# Patient Record
Sex: FEMALE | Race: BLACK OR AFRICAN AMERICAN | HISPANIC OR LATINO
[De-identification: names, ages, dates, MRNs, and addresses within clinical notes are randomized per-mention and may not be internally consistent; named-entity substitution may affect disease eponyms.]

---

## 2017-03-21 ENCOUNTER — IMPORTED ENCOUNTER (OUTPATIENT)
Age: 67
End: 2017-03-21

## 2017-03-21 PROCEDURE — 99214 OFFICE O/P EST MOD 30 MIN: CPT

## 2017-04-14 ENCOUNTER — IMPORTED ENCOUNTER (OUTPATIENT)
Age: 67
End: 2017-04-14

## 2017-04-14 PROCEDURE — 92134 CPTRZ OPH DX IMG PST SGM RTA: CPT

## 2017-04-14 PROCEDURE — 99213 OFFICE O/P EST LOW 20 MIN: CPT

## 2017-04-14 PROCEDURE — 92083 EXTENDED VISUAL FIELD XM: CPT

## 2017-05-12 ENCOUNTER — IMPORTED ENCOUNTER (OUTPATIENT)
Age: 67
End: 2017-05-12

## 2017-05-12 ENCOUNTER — PREPPED CHART (OUTPATIENT)
Age: 67
End: 2017-05-12

## 2017-05-12 PROCEDURE — 76514 ECHO EXAM OF EYE THICKNESS: CPT

## 2017-05-12 PROCEDURE — 92020 GONIOSCOPY: CPT

## 2017-05-12 PROCEDURE — 99213 OFFICE O/P EST LOW 20 MIN: CPT

## 2017-09-15 ENCOUNTER — ESTABLISHED (OUTPATIENT)
Age: 67
End: 2017-09-15

## 2017-09-15 DIAGNOSIS — H16.223: ICD-10-CM

## 2017-09-15 DIAGNOSIS — H40.052: ICD-10-CM

## 2017-09-15 DIAGNOSIS — M32.10: ICD-10-CM

## 2017-09-15 DIAGNOSIS — H40.1112: ICD-10-CM

## 2017-09-15 PROCEDURE — G8785 BP SCRN NO PERF AT INTERVAL: HCPCS

## 2017-09-15 PROCEDURE — 2027F OPTIC NERVE HEAD EVAL DONE: CPT

## 2017-09-15 PROCEDURE — 1036F TOBACCO NON-USER: CPT

## 2017-09-15 PROCEDURE — 3285F IOP DOWN <15% OF PRE-SVC LVL: CPT

## 2017-09-15 PROCEDURE — G8427 DOCREV CUR MEDS BY ELIG CLIN: HCPCS

## 2017-09-15 PROCEDURE — 92250 FUNDUS PHOTOGRAPHY W/I&R: CPT

## 2017-09-15 PROCEDURE — 92012 INTRM OPH EXAM EST PATIENT: CPT

## 2017-09-15 PROCEDURE — 0517F GLAUCOMA PLAN OF CARE DOCD: CPT

## 2017-09-15 PROCEDURE — 4040F PNEUMOC VAC/ADMIN/RCVD: CPT | Mod: 8P

## 2017-09-15 PROCEDURE — 3284F IOP RED >=15% PRE-NTRV LVL: CPT

## 2017-09-15 ASSESSMENT — VISUAL ACUITY
OS_CC: 20/30-1
OS_CC: 20/25-2
OD_CC: 20/30-1
OD_CC: 20/25-2

## 2017-09-15 ASSESSMENT — TONOMETRY
OS_IOP_MMHG: 12
OS_IOP_MMHG: 14
OD_IOP_MMHG: 14
OD_IOP_MMHG: 14

## 2017-09-15 ASSESSMENT — PACHYMETRY
OD_CT_UM: 540
OS_CT_UM: 535

## 2017-10-06 ASSESSMENT — VISUAL ACUITY
OS_PH: 20/40-
OD_CC: 20/25
OD_PH: 20/
OS_CC: 20/60-
OD_PH: 20/
OS_CC: 20/30-
OD_CC: 20/25
OS_PH: 20/40-
OS_CC: 20/60-
OD_CC: 20/30-

## 2017-10-06 ASSESSMENT — TONOMETRY
OD_IOP_MMHG: 14
OS_IOP_MMHG: 12
OD_IOP_MMHG: 20
OS_IOP_MMHG: 15
OS_IOP_MMHG: 27
OD_IOP_MMHG: 24

## 2017-12-12 ENCOUNTER — DIAGNOSTICS ONLY (OUTPATIENT)
Age: 67
End: 2017-12-12

## 2017-12-12 DIAGNOSIS — M32.10: ICD-10-CM

## 2017-12-12 DIAGNOSIS — Z79.899: ICD-10-CM

## 2017-12-12 PROCEDURE — 92134 CPTRZ OPH DX IMG PST SGM RTA: CPT

## 2018-03-13 ENCOUNTER — ESTABLISHED (OUTPATIENT)
Age: 68
End: 2018-03-13

## 2018-03-13 DIAGNOSIS — H40.1112: ICD-10-CM

## 2018-03-13 DIAGNOSIS — H40.052: ICD-10-CM

## 2018-03-13 DIAGNOSIS — H35.363: ICD-10-CM

## 2018-03-13 DIAGNOSIS — Z79.899: ICD-10-CM

## 2018-03-13 DIAGNOSIS — H43.393: ICD-10-CM

## 2018-03-13 DIAGNOSIS — H16.223: ICD-10-CM

## 2018-03-13 DIAGNOSIS — M32.10: ICD-10-CM

## 2018-03-13 DIAGNOSIS — H25.813: ICD-10-CM

## 2018-03-13 PROCEDURE — 92014 COMPRE OPH EXAM EST PT 1/>: CPT

## 2018-03-13 PROCEDURE — 92133 CPTRZD OPH DX IMG PST SGM ON: CPT

## 2018-03-13 PROCEDURE — 92083 EXTENDED VISUAL FIELD XM: CPT

## 2018-03-13 ASSESSMENT — TONOMETRY
OS_IOP_MMHG: 17
OD_IOP_MMHG: 17

## 2018-03-13 ASSESSMENT — VISUAL ACUITY
OS_CC: 20/40-1
OD_CC: 20/50+1

## 2018-07-31 ENCOUNTER — ESTABLISHED (OUTPATIENT)
Age: 68
End: 2018-07-31

## 2018-07-31 DIAGNOSIS — H40.1112: ICD-10-CM

## 2018-07-31 DIAGNOSIS — H40.052: ICD-10-CM

## 2018-07-31 DIAGNOSIS — H16.223: ICD-10-CM

## 2018-07-31 PROCEDURE — A4263 PERMANENT TEAR DUCT PLUG: HCPCS

## 2018-07-31 PROCEDURE — 99213 OFFICE O/P EST LOW 20 MIN: CPT | Mod: 25

## 2018-07-31 PROCEDURE — 92250 FUNDUS PHOTOGRAPHY W/I&R: CPT

## 2018-07-31 PROCEDURE — 68761 CLOSE TEAR DUCT OPENING: CPT | Mod: 51,E4

## 2018-07-31 ASSESSMENT — TONOMETRY
OS_IOP_MMHG: 15
OD_IOP_MMHG: 17

## 2018-07-31 ASSESSMENT — VISUAL ACUITY
OS_CC: 20/25+1
OD_CC: 20/25+1

## 2018-10-23 ENCOUNTER — IOP CHECK (OUTPATIENT)
Age: 68
End: 2018-10-23

## 2018-10-23 DIAGNOSIS — H40.1112: ICD-10-CM

## 2018-10-23 DIAGNOSIS — H40.052: ICD-10-CM

## 2018-10-23 DIAGNOSIS — M35.0: ICD-10-CM

## 2018-10-23 PROCEDURE — 99213 OFFICE O/P EST LOW 20 MIN: CPT | Mod: 25

## 2018-10-23 PROCEDURE — A4263 PERMANENT TEAR DUCT PLUG: HCPCS

## 2018-10-23 PROCEDURE — 68761 CLOSE TEAR DUCT OPENING: CPT | Mod: 51,E3

## 2018-10-23 ASSESSMENT — TONOMETRY
OS_IOP_MMHG: 14
OD_IOP_MMHG: 13

## 2018-10-23 ASSESSMENT — VISUAL ACUITY
OS_CC: 20/20
OD_CC: 20/20-2

## 2018-10-24 ENCOUNTER — ESTABLISHED (OUTPATIENT)
Age: 68
End: 2018-10-24

## 2018-10-24 DIAGNOSIS — H04.223: ICD-10-CM

## 2018-10-24 PROCEDURE — 99024 POSTOP FOLLOW-UP VISIT: CPT

## 2019-04-19 ENCOUNTER — ESTABLISHED COMPREHENSIVE EXAM (OUTPATIENT)
Age: 69
End: 2019-04-19

## 2019-04-19 DIAGNOSIS — M35.0: ICD-10-CM

## 2019-04-19 DIAGNOSIS — H31.103: ICD-10-CM

## 2019-04-19 DIAGNOSIS — Z79.899: ICD-10-CM

## 2019-04-19 DIAGNOSIS — H43.393: ICD-10-CM

## 2019-04-19 DIAGNOSIS — H40.1112: ICD-10-CM

## 2019-04-19 DIAGNOSIS — M32.10: ICD-10-CM

## 2019-04-19 DIAGNOSIS — H40.052: ICD-10-CM

## 2019-04-19 DIAGNOSIS — H25.013: ICD-10-CM

## 2019-04-19 PROCEDURE — 92083 EXTENDED VISUAL FIELD XM: CPT

## 2019-04-19 PROCEDURE — 92014 COMPRE OPH EXAM EST PT 1/>: CPT

## 2019-04-19 PROCEDURE — 92133 CPTRZD OPH DX IMG PST SGM ON: CPT

## 2019-04-19 ASSESSMENT — TONOMETRY
OS_IOP_MMHG: 11
OD_IOP_MMHG: 12

## 2019-04-19 ASSESSMENT — VISUAL ACUITY
OS_CC: 20/20-2
OD_CC: 20/25+1

## 2019-09-24 ENCOUNTER — IOP CHECK (OUTPATIENT)
Age: 69
End: 2019-09-24

## 2019-09-24 DIAGNOSIS — Z79.899: ICD-10-CM

## 2019-09-24 DIAGNOSIS — H31.103: ICD-10-CM

## 2019-09-24 DIAGNOSIS — H43.393: ICD-10-CM

## 2019-09-24 DIAGNOSIS — M35.0: ICD-10-CM

## 2019-09-24 DIAGNOSIS — H40.1112: ICD-10-CM

## 2019-09-24 DIAGNOSIS — H40.052: ICD-10-CM

## 2019-09-24 DIAGNOSIS — H25.013: ICD-10-CM

## 2019-09-24 DIAGNOSIS — M32.10: ICD-10-CM

## 2019-09-24 PROCEDURE — 92134 CPTRZ OPH DX IMG PST SGM RTA: CPT

## 2019-09-24 PROCEDURE — 92012 INTRM OPH EXAM EST PATIENT: CPT

## 2019-09-24 ASSESSMENT — VISUAL ACUITY
OS_CC: 20/25+1
OD_CC: 20/30-1

## 2019-09-24 ASSESSMENT — TONOMETRY
OS_IOP_MMHG: 15
OD_IOP_MMHG: 13

## 2020-05-28 ENCOUNTER — APPOINTMENT (RX ONLY)
Dept: URBAN - METROPOLITAN AREA CLINIC 151 | Facility: CLINIC | Age: 70
Setting detail: DERMATOLOGY
End: 2020-05-28

## 2020-05-28 DIAGNOSIS — L93.1 SUBACUTE CUTANEOUS LUPUS ERYTHEMATOSUS: ICD-10-CM | Status: INADEQUATELY CONTROLLED

## 2020-05-28 PROCEDURE — ? COUNSELING

## 2020-05-28 PROCEDURE — ? PRESCRIPTION

## 2020-05-28 PROCEDURE — ? DIAGNOSIS COMMENT

## 2020-05-28 PROCEDURE — 99203 OFFICE O/P NEW LOW 30 MIN: CPT

## 2020-05-28 RX ORDER — TRIAMCINOLONE ACETONIDE 0.25 MG/G
OINTMENT TOPICAL
Qty: 1 | Refills: 3 | Status: ERX | COMMUNITY
Start: 2020-05-28

## 2020-05-28 RX ADMIN — TRIAMCINOLONE ACETONIDE: 0.25 OINTMENT TOPICAL at 00:00

## 2020-05-28 ASSESSMENT — LOCATION ZONE DERM
LOCATION ZONE: LEG
LOCATION ZONE: ARM

## 2020-05-28 ASSESSMENT — LOCATION SIMPLE DESCRIPTION DERM
LOCATION SIMPLE: RIGHT FOREARM
LOCATION SIMPLE: LEFT PRETIBIAL REGION
LOCATION SIMPLE: LEFT FOREARM
LOCATION SIMPLE: RIGHT PRETIBIAL REGION

## 2020-05-28 ASSESSMENT — LOCATION DETAILED DESCRIPTION DERM
LOCATION DETAILED: LEFT PROXIMAL DORSAL FOREARM
LOCATION DETAILED: RIGHT PROXIMAL DORSAL FOREARM
LOCATION DETAILED: LEFT PROXIMAL PRETIBIAL REGION
LOCATION DETAILED: RIGHT PROXIMAL PRETIBIAL REGION

## 2020-05-28 NOTE — PROCEDURE: DIAGNOSIS COMMENT
Detail Level: Zone
Comment: Pt reports that this occurs in the summer months with more sun exposure. She has responded to prednisone courses (20 mg tapers).

## 2020-05-28 NOTE — PROCEDURE: MIPS QUALITY
Detail Level: Detailed
Quality 111:Pneumonia Vaccination Status For Older Adults: Pneumococcal Vaccination Previously Received
Quality 130: Documentation Of Current Medications In The Medical Record: Current Medications Documented
Quality 402: Tobacco Use And Help With Quitting Among Adolescents: Patient screened for tobacco and never smoked
Quality 431: Preventive Care And Screening: Unhealthy Alcohol Use - Screening: Patient screened for unhealthy alcohol use using a single question and scores less than 2 times per year
Quality 110: Preventive Care And Screening: Influenza Immunization: Influenza Immunization previously received during influenza season

## 2020-05-28 NOTE — PROCEDURE: COUNSELING
Patient Specific Counseling (Will Not Stick From Patient To Patient): -Start Triamcinolone ointment BID x2 weeks and use occlusion with Saran Wrap at night. \\n-Discussed using daily sunscreen (broad spectrum coverage and SPF 50+) - La Roche samples given.\\n-Pt to finish prednisone taper as rx’d by rheumatologist.\\n-Discussed Coolibar sun protective clothing. Coupon given.
Detail Level: Detailed

## 2021-01-26 ENCOUNTER — DILATED FUNDUS EXAM (OUTPATIENT)
Age: 71
End: 2021-01-26

## 2021-01-26 DIAGNOSIS — H25.013: ICD-10-CM

## 2021-01-26 DIAGNOSIS — H31.103: ICD-10-CM

## 2021-01-26 DIAGNOSIS — Z79.899: ICD-10-CM

## 2021-01-26 DIAGNOSIS — H40.1112: ICD-10-CM

## 2021-01-26 DIAGNOSIS — M35.0: ICD-10-CM

## 2021-01-26 DIAGNOSIS — M32.10: ICD-10-CM

## 2021-01-26 DIAGNOSIS — H40.052: ICD-10-CM

## 2021-01-26 PROCEDURE — 99214 OFFICE O/P EST MOD 30 MIN: CPT

## 2021-01-26 PROCEDURE — 92083 EXTENDED VISUAL FIELD XM: CPT

## 2021-01-26 PROCEDURE — 92133 CPTRZD OPH DX IMG PST SGM ON: CPT

## 2021-01-26 ASSESSMENT — TONOMETRY
OS_IOP_MMHG: 10
OD_IOP_MMHG: 10

## 2021-01-26 ASSESSMENT — VISUAL ACUITY
OS_CC: 20/25+1
OD_CC: 20/30

## 2021-07-27 ENCOUNTER — DILATED FUNDUS EXAM (OUTPATIENT)
Age: 71
End: 2021-07-27

## 2021-07-27 DIAGNOSIS — H02.831: ICD-10-CM

## 2021-07-27 DIAGNOSIS — H31.103: ICD-10-CM

## 2021-07-27 DIAGNOSIS — M35.0: ICD-10-CM

## 2021-07-27 DIAGNOSIS — H43.393: ICD-10-CM

## 2021-07-27 DIAGNOSIS — H25.013: ICD-10-CM

## 2021-07-27 DIAGNOSIS — M32.10: ICD-10-CM

## 2021-07-27 DIAGNOSIS — H40.1112: ICD-10-CM

## 2021-07-27 DIAGNOSIS — H40.052: ICD-10-CM

## 2021-07-27 DIAGNOSIS — Z79.899: ICD-10-CM

## 2021-07-27 DIAGNOSIS — H02.834: ICD-10-CM

## 2021-07-27 PROCEDURE — 92083 EXTENDED VISUAL FIELD XM: CPT

## 2021-07-27 PROCEDURE — 99214 OFFICE O/P EST MOD 30 MIN: CPT

## 2021-07-27 PROCEDURE — 92134 CPTRZ OPH DX IMG PST SGM RTA: CPT

## 2021-07-27 ASSESSMENT — VISUAL ACUITY
OD_CC: 20/40
OS_CC: 20/30+1

## 2021-07-27 ASSESSMENT — TONOMETRY
OS_IOP_MMHG: 11
OD_IOP_MMHG: 12

## 2021-08-02 ENCOUNTER — DIAGNOSTICS ONLY (OUTPATIENT)
Age: 71
End: 2021-08-02

## 2021-08-02 DIAGNOSIS — M35.0: ICD-10-CM

## 2021-08-02 DIAGNOSIS — Z79.899: ICD-10-CM

## 2021-08-02 DIAGNOSIS — M32.10: ICD-10-CM

## 2021-08-02 PROCEDURE — 92083 EXTENDED VISUAL FIELD XM: CPT

## 2022-01-25 ENCOUNTER — IOP CHECK (OUTPATIENT)
Age: 72
End: 2022-01-25

## 2022-01-25 DIAGNOSIS — H02.831: ICD-10-CM

## 2022-01-25 DIAGNOSIS — H40.1112: ICD-10-CM

## 2022-01-25 DIAGNOSIS — H02.834: ICD-10-CM

## 2022-01-25 DIAGNOSIS — H40.052: ICD-10-CM

## 2022-01-25 DIAGNOSIS — M35.0: ICD-10-CM

## 2022-01-25 PROCEDURE — 68761 CLOSE TEAR DUCT OPENING: CPT | Mod: E4

## 2022-01-25 PROCEDURE — 92133 CPTRZD OPH DX IMG PST SGM ON: CPT

## 2022-01-25 PROCEDURE — 99213 OFFICE O/P EST LOW 20 MIN: CPT | Mod: 25

## 2022-01-25 PROCEDURE — 92083 EXTENDED VISUAL FIELD XM: CPT

## 2022-01-25 PROCEDURE — A4263 PERMANENT TEAR DUCT PLUG: HCPCS

## 2022-01-25 ASSESSMENT — VISUAL ACUITY
OD_CC: 20/40+2
OS_CC: 20/40+2

## 2022-01-25 ASSESSMENT — TONOMETRY
OS_IOP_MMHG: 17
OD_IOP_MMHG: 15

## 2022-08-24 ENCOUNTER — COMPLETE EYE EXAM (OUTPATIENT)
Dept: URBAN - METROPOLITAN AREA CLINIC 45 | Facility: CLINIC | Age: 72
End: 2022-08-24

## 2022-08-24 DIAGNOSIS — H25.013: ICD-10-CM

## 2022-08-24 DIAGNOSIS — Z98.890: ICD-10-CM

## 2022-08-24 DIAGNOSIS — H40.052: ICD-10-CM

## 2022-08-24 DIAGNOSIS — H40.1112: ICD-10-CM

## 2022-08-24 DIAGNOSIS — H43.393: ICD-10-CM

## 2022-08-24 DIAGNOSIS — Z79.899: ICD-10-CM

## 2022-08-24 DIAGNOSIS — M32.10: ICD-10-CM

## 2022-08-24 DIAGNOSIS — H31.103: ICD-10-CM

## 2022-08-24 PROCEDURE — 92083 EXTENDED VISUAL FIELD XM: CPT

## 2022-08-24 PROCEDURE — 92134 CPTRZ OPH DX IMG PST SGM RTA: CPT

## 2022-08-24 PROCEDURE — 92014 COMPRE OPH EXAM EST PT 1/>: CPT

## 2022-08-24 ASSESSMENT — TONOMETRY
OS_IOP_MMHG: 13
OD_IOP_MMHG: 12

## 2022-08-24 ASSESSMENT — VISUAL ACUITY
OD_CC: 20/30-2
OS_CC: 20/40+2

## 2022-08-24 ASSESSMENT — PACHYMETRY
OD_CT_UM: 540
OS_CT_UM: 535

## 2023-02-28 ENCOUNTER — IOP CHECK (OUTPATIENT)
Dept: URBAN - METROPOLITAN AREA CLINIC 45 | Facility: CLINIC | Age: 73
End: 2023-02-28

## 2023-02-28 DIAGNOSIS — H40.1112: ICD-10-CM

## 2023-02-28 DIAGNOSIS — M35.00: ICD-10-CM

## 2023-02-28 DIAGNOSIS — H40.052: ICD-10-CM

## 2023-02-28 PROCEDURE — 92083 EXTENDED VISUAL FIELD XM: CPT

## 2023-02-28 PROCEDURE — 92133 CPTRZD OPH DX IMG PST SGM ON: CPT

## 2023-02-28 PROCEDURE — 68761 CLOSE TEAR DUCT OPENING: CPT | Mod: E2

## 2023-02-28 PROCEDURE — 92012 INTRM OPH EXAM EST PATIENT: CPT | Mod: 25

## 2023-02-28 ASSESSMENT — VISUAL ACUITY
OS_CC: 20/40
OD_CC: 20/30

## 2023-02-28 ASSESSMENT — TONOMETRY
OD_IOP_MMHG: 15
OS_IOP_MMHG: 15

## 2023-07-31 ENCOUNTER — DILATED FUNDUS EXAM (OUTPATIENT)
Dept: URBAN - METROPOLITAN AREA CLINIC 45 | Facility: CLINIC | Age: 73
End: 2023-07-31

## 2023-07-31 DIAGNOSIS — H25.013: ICD-10-CM

## 2023-07-31 DIAGNOSIS — H40.052: ICD-10-CM

## 2023-07-31 DIAGNOSIS — Z98.890: ICD-10-CM

## 2023-07-31 DIAGNOSIS — H31.103: ICD-10-CM

## 2023-07-31 DIAGNOSIS — M35.00: ICD-10-CM

## 2023-07-31 DIAGNOSIS — Z79.899: ICD-10-CM

## 2023-07-31 DIAGNOSIS — H40.1112: ICD-10-CM

## 2023-07-31 DIAGNOSIS — H43.393: ICD-10-CM

## 2023-07-31 DIAGNOSIS — M32.10: ICD-10-CM

## 2023-07-31 PROCEDURE — 92014 COMPRE OPH EXAM EST PT 1/>: CPT

## 2023-07-31 PROCEDURE — 92134 CPTRZ OPH DX IMG PST SGM RTA: CPT

## 2023-07-31 PROCEDURE — 92083 EXTENDED VISUAL FIELD XM: CPT

## 2023-07-31 ASSESSMENT — VISUAL ACUITY
OD_CC: 20/30+2
OS_CC: 20/30-2

## 2023-07-31 ASSESSMENT — TONOMETRY
OS_IOP_MMHG: 13
OD_IOP_MMHG: 15

## 2023-09-26 ENCOUNTER — APPOINTMENT (RX ONLY)
Dept: URBAN - METROPOLITAN AREA CLINIC 151 | Facility: CLINIC | Age: 73
Setting detail: DERMATOLOGY
End: 2023-09-26

## 2023-09-26 DIAGNOSIS — L93.1 SUBACUTE CUTANEOUS LUPUS ERYTHEMATOSUS: ICD-10-CM

## 2023-09-26 PROCEDURE — ? PRESCRIPTION

## 2023-09-26 PROCEDURE — ? DIAGNOSIS COMMENT

## 2023-09-26 PROCEDURE — 99203 OFFICE O/P NEW LOW 30 MIN: CPT

## 2023-09-26 PROCEDURE — ? COUNSELING

## 2023-09-26 PROCEDURE — ? PRESCRIPTION MEDICATION MANAGEMENT

## 2023-09-26 RX ORDER — TRIAMCINOLONE ACETONIDE 1 MG/G
CREAM TOPICAL BID
Qty: 45 | Refills: 1 | Status: ERX | COMMUNITY
Start: 2023-09-26

## 2023-09-26 RX ORDER — FLUOCINONIDE 0.5 MG/ML
SOLUTION TOPICAL
Qty: 60 | Refills: 1 | Status: ERX | COMMUNITY
Start: 2023-09-26

## 2023-09-26 RX ADMIN — FLUOCINONIDE: 0.5 SOLUTION TOPICAL at 00:00

## 2023-09-26 RX ADMIN — TRIAMCINOLONE ACETONIDE: 1 CREAM TOPICAL at 00:00

## 2023-09-26 ASSESSMENT — LOCATION DETAILED DESCRIPTION DERM
LOCATION DETAILED: LEFT PROXIMAL DORSAL FOREARM
LOCATION DETAILED: LEFT PROXIMAL PRETIBIAL REGION
LOCATION DETAILED: RIGHT PROXIMAL PRETIBIAL REGION
LOCATION DETAILED: RIGHT PROXIMAL DORSAL FOREARM

## 2023-09-26 ASSESSMENT — LOCATION SIMPLE DESCRIPTION DERM
LOCATION SIMPLE: LEFT PRETIBIAL REGION
LOCATION SIMPLE: RIGHT PRETIBIAL REGION
LOCATION SIMPLE: LEFT FOREARM
LOCATION SIMPLE: RIGHT FOREARM

## 2023-09-26 ASSESSMENT — LOCATION ZONE DERM
LOCATION ZONE: LEG
LOCATION ZONE: ARM

## 2023-09-26 NOTE — HPI: OTHER
Condition:: Itching
Please Describe Your Condition:: is a new patient who is being seen for a chief complaint of Itching. \\n> unsure if related to Lupus, taking plaquenil 150 mg and prednisone 5 mg QD\\n> when she is in sun or in heat, begins to itch previously last seen in 2020. Now, she begins to itch without any stimulants. \\n> TAC helped well

## 2023-09-26 NOTE — PROCEDURE: PRESCRIPTION MEDICATION MANAGEMENT
Render In Strict Bullet Format?: No
Detail Level: Zone
Initiate Treatment: triamcinolone acetonide 0.1 % topical cream BID to rash up to 2 weeks/month as needed.\\n\\nfluocinonide 0.05 % topical solution to scalp BID up to two weeks.

## 2023-09-26 NOTE — PROCEDURE: DIAGNOSIS COMMENT
Detail Level: Zone
Comment: Apply Fluocinonide BID up to two weeks to scalp during itchy flares, then taper to 1-2x weekly\\nPatient notes that rash comes and goes. Plan to reinitiate TAC BID x 2 weeks on body as needed. Consult with rheumatologist about updated lab work.
Render Risk Assessment In Note?: no

## 2024-02-14 ENCOUNTER — IOP CHECK (OUTPATIENT)
Dept: URBAN - METROPOLITAN AREA CLINIC 45 | Facility: CLINIC | Age: 74
End: 2024-02-14

## 2024-02-14 DIAGNOSIS — M35.00: ICD-10-CM

## 2024-02-14 DIAGNOSIS — H40.1112: ICD-10-CM

## 2024-02-14 DIAGNOSIS — H40.052: ICD-10-CM

## 2024-02-14 PROCEDURE — 92133 CPTRZD OPH DX IMG PST SGM ON: CPT

## 2024-02-14 PROCEDURE — 92083 EXTENDED VISUAL FIELD XM: CPT

## 2024-02-14 PROCEDURE — 99213 OFFICE O/P EST LOW 20 MIN: CPT

## 2024-02-14 ASSESSMENT — TONOMETRY
OD_IOP_MMHG: 16
OS_IOP_MMHG: 16

## 2024-02-14 ASSESSMENT — VISUAL ACUITY
OD_CC: 20/30-1
OS_CC: 20/25

## 2024-03-14 ENCOUNTER — ESTABLISHED COMPREHENSIVE EXAM (OUTPATIENT)
Dept: URBAN - METROPOLITAN AREA CLINIC 45 | Facility: CLINIC | Age: 74
End: 2024-03-14

## 2024-03-14 DIAGNOSIS — Z79.899: ICD-10-CM

## 2024-03-14 DIAGNOSIS — M32.10: ICD-10-CM

## 2024-03-14 DIAGNOSIS — M35.00: ICD-10-CM

## 2024-03-14 DIAGNOSIS — H40.052: ICD-10-CM

## 2024-03-14 DIAGNOSIS — H40.1112: ICD-10-CM

## 2024-03-14 DIAGNOSIS — H43.393: ICD-10-CM

## 2024-03-14 DIAGNOSIS — H18.593: ICD-10-CM

## 2024-03-14 DIAGNOSIS — H25.813: ICD-10-CM

## 2024-03-14 DIAGNOSIS — H31.103: ICD-10-CM

## 2024-03-14 PROCEDURE — 92014 COMPRE OPH EXAM EST PT 1/>: CPT | Mod: 25

## 2024-03-14 PROCEDURE — 92083 EXTENDED VISUAL FIELD XM: CPT

## 2024-03-14 PROCEDURE — 92134 CPTRZ OPH DX IMG PST SGM RTA: CPT

## 2024-03-14 PROCEDURE — 68761 CLOSE TEAR DUCT OPENING: CPT

## 2024-03-14 ASSESSMENT — VISUAL ACUITY
OD_CC: 20/25
OS_CC: 20/30+1

## 2024-03-14 ASSESSMENT — TONOMETRY
OD_IOP_MMHG: 15
OS_IOP_MMHG: 15

## 2024-04-18 ENCOUNTER — FOLLOW UP (OUTPATIENT)
Dept: URBAN - METROPOLITAN AREA CLINIC 45 | Facility: CLINIC | Age: 74
End: 2024-04-18

## 2024-04-18 DIAGNOSIS — H25.813: ICD-10-CM

## 2024-04-18 DIAGNOSIS — M32.10: ICD-10-CM

## 2024-04-18 DIAGNOSIS — M35.00: ICD-10-CM

## 2024-04-18 DIAGNOSIS — H18.593: ICD-10-CM

## 2024-04-18 DIAGNOSIS — H40.052: ICD-10-CM

## 2024-04-18 DIAGNOSIS — H43.393: ICD-10-CM

## 2024-04-18 DIAGNOSIS — H40.1112: ICD-10-CM

## 2024-04-18 DIAGNOSIS — Z79.899: ICD-10-CM

## 2024-04-18 DIAGNOSIS — H31.103: ICD-10-CM

## 2024-04-18 PROCEDURE — 92025 CPTRIZED CORNEAL TOPOGRAPHY: CPT

## 2024-04-18 PROCEDURE — 99214 OFFICE O/P EST MOD 30 MIN: CPT

## 2024-04-18 ASSESSMENT — KERATOMETRY
OS_K2POWER_DIOPTERS: 45.75
OD_K2POWER_DIOPTERS: 45.25
OS_K1POWER_DIOPTERS: 44.75
OS_AXISANGLE2_DEGREES: 2
OD_K1POWER_DIOPTERS: 44.75
OD_AXISANGLE_DEGREES: 65
OD_AXISANGLE2_DEGREES: 155
OS_AXISANGLE_DEGREES: 92

## 2024-04-18 ASSESSMENT — VISUAL ACUITY
OS_CC: 20/40+2
OD_CC: 20/40-1

## 2024-05-24 ENCOUNTER — DIAGNOSTICS ONLY (OUTPATIENT)
Dept: URBAN - METROPOLITAN AREA CLINIC 45 | Facility: CLINIC | Age: 74
End: 2024-05-24

## 2024-05-24 DIAGNOSIS — H25.813: ICD-10-CM

## 2024-05-24 DIAGNOSIS — H18.593: ICD-10-CM

## 2024-05-24 PROCEDURE — 92136 OPHTHALMIC BIOMETRY: CPT

## 2024-05-24 PROCEDURE — 92025 CPTRIZED CORNEAL TOPOGRAPHY: CPT

## 2024-05-24 ASSESSMENT — KERATOMETRY
OD_K1POWER_DIOPTERS: 44.75
OD_AXISANGLE2_DEGREES: 155
OS_K2POWER_DIOPTERS: 45.75
OD_K2POWER_DIOPTERS: 45.25
OS_K1POWER_DIOPTERS: 44.75
OS_AXISANGLE_DEGREES: 92
OD_AXISANGLE_DEGREES: 65
OS_AXISANGLE2_DEGREES: 2

## 2024-06-03 ASSESSMENT — KERATOMETRY
OS_AXISANGLE2_DEGREES: 2
OD_AXISANGLE2_DEGREES: 155
OD_K2POWER_DIOPTERS: 45.25
OD_AXISANGLE_DEGREES: 65
OS_K2POWER_DIOPTERS: 45.75
OS_AXISANGLE_DEGREES: 92
OS_K1POWER_DIOPTERS: 44.75
OD_K1POWER_DIOPTERS: 44.75

## 2024-06-04 ENCOUNTER — SURGERY/PROCEDURE (OUTPATIENT)
Dept: URBAN - METROPOLITAN AREA SURGICAL CENTER 15 | Facility: SURGICAL CENTER | Age: 74
End: 2024-06-04

## 2024-06-04 DIAGNOSIS — H25.811: ICD-10-CM

## 2024-06-04 PROCEDURE — 66984 XCAPSL CTRC RMVL W/O ECP: CPT

## 2024-06-04 ASSESSMENT — KERATOMETRY
OD_AXISANGLE2_DEGREES: 155
OD_AXISANGLE_DEGREES: 65
OD_K1POWER_DIOPTERS: 44.75
OS_AXISANGLE_DEGREES: 92
OS_K1POWER_DIOPTERS: 44.75
OS_K2POWER_DIOPTERS: 45.75
OS_AXISANGLE2_DEGREES: 2
OD_K2POWER_DIOPTERS: 45.25

## 2024-06-05 ENCOUNTER — 1 DAY POST-OP (OUTPATIENT)
Dept: URBAN - METROPOLITAN AREA CLINIC 45 | Facility: CLINIC | Age: 74
End: 2024-06-05

## 2024-06-05 DIAGNOSIS — Z96.1: ICD-10-CM

## 2024-06-05 PROCEDURE — 99024 POSTOP FOLLOW-UP VISIT: CPT

## 2024-06-05 ASSESSMENT — VISUAL ACUITY
OD_SC: 20/150-1
OD_SC: J3

## 2024-06-13 ENCOUNTER — 1 WEEK POST-OP (OUTPATIENT)
Dept: URBAN - METROPOLITAN AREA CLINIC 45 | Facility: CLINIC | Age: 74
End: 2024-06-13

## 2024-06-13 DIAGNOSIS — H25.812: ICD-10-CM

## 2024-06-13 DIAGNOSIS — Z96.1: ICD-10-CM

## 2024-06-13 PROCEDURE — 99024 POSTOP FOLLOW-UP VISIT: CPT

## 2024-06-13 PROCEDURE — 92136 OPHTHALMIC BIOMETRY: CPT | Mod: 26,LT

## 2024-06-13 ASSESSMENT — KERATOMETRY
OS_AXISANGLE_DEGREES: 92
OD_AXISANGLE2_DEGREES: 155
OS_K1POWER_DIOPTERS: 44.75
OS_K2POWER_DIOPTERS: 45.75
OD_AXISANGLE_DEGREES: 65
OD_AXISANGLE2_DEGREES: 152
OS_AXISANGLE2_DEGREES: 2
OD_K1POWER_DIOPTERS: 44.75
OD_K1POWER_DIOPTERS: 44.25
OD_K2POWER_DIOPTERS: 45.00
OD_K2POWER_DIOPTERS: 45.25
OD_AXISANGLE_DEGREES: 62

## 2024-06-13 ASSESSMENT — VISUAL ACUITY
OD_SC: J5
OS_CC: 20/40+2
OD_SC: 20/80

## 2024-06-17 ASSESSMENT — KERATOMETRY
OD_K2POWER_DIOPTERS: 45.00
OD_AXISANGLE_DEGREES: 62
OD_AXISANGLE2_DEGREES: 152
OD_K1POWER_DIOPTERS: 44.75
OD_K2POWER_DIOPTERS: 45.25
OS_AXISANGLE_DEGREES: 92
OS_K2POWER_DIOPTERS: 45.75
OD_AXISANGLE2_DEGREES: 155
OS_AXISANGLE2_DEGREES: 2
OD_AXISANGLE_DEGREES: 65
OS_K1POWER_DIOPTERS: 44.75
OD_K1POWER_DIOPTERS: 44.25

## 2024-06-18 ENCOUNTER — SURGERY/PROCEDURE (OUTPATIENT)
Dept: URBAN - METROPOLITAN AREA SURGICAL CENTER 15 | Facility: SURGICAL CENTER | Age: 74
End: 2024-06-18

## 2024-06-18 DIAGNOSIS — H25.812: ICD-10-CM

## 2024-06-18 PROCEDURE — 66984 XCAPSL CTRC RMVL W/O ECP: CPT | Mod: 79,LT

## 2024-06-19 ENCOUNTER — 1 DAY POST-OP (OUTPATIENT)
Dept: URBAN - METROPOLITAN AREA CLINIC 45 | Facility: CLINIC | Age: 74
End: 2024-06-19

## 2024-06-19 DIAGNOSIS — Z96.1: ICD-10-CM

## 2024-06-19 PROCEDURE — 99024 POSTOP FOLLOW-UP VISIT: CPT

## 2024-06-19 ASSESSMENT — VISUAL ACUITY
OD_SC: 20/100-2
OS_SC: 20/100

## 2024-06-25 ENCOUNTER — 1 WEEK POST-OP (OUTPATIENT)
Dept: URBAN - METROPOLITAN AREA CLINIC 45 | Facility: CLINIC | Age: 74
End: 2024-06-25

## 2024-06-25 DIAGNOSIS — Z96.1: ICD-10-CM

## 2024-06-25 PROCEDURE — 99024 POSTOP FOLLOW-UP VISIT: CPT

## 2024-06-25 ASSESSMENT — VISUAL ACUITY
OS_SC: J1
OD_SC: J2

## 2024-06-25 ASSESSMENT — KERATOMETRY
OD_AXISANGLE_DEGREES: 34
OD_K1POWER_DIOPTERS: 44.25
OS_K2POWER_DIOPTERS: 45.25
OD_AXISANGLE2_DEGREES: 124
OS_AXISANGLE2_DEGREES: 40
OD_K2POWER_DIOPTERS: 45.25
OS_K1POWER_DIOPTERS: 44.5
OS_AXISANGLE_DEGREES: 130

## 2024-08-20 ENCOUNTER — POST-OP CHECK (OUTPATIENT)
Dept: URBAN - METROPOLITAN AREA CLINIC 45 | Facility: CLINIC | Age: 74
End: 2024-08-20

## 2024-08-20 DIAGNOSIS — Z96.1: ICD-10-CM

## 2024-08-20 PROCEDURE — 99024 POSTOP FOLLOW-UP VISIT: CPT

## 2024-08-20 ASSESSMENT — KERATOMETRY
OS_AXISANGLE_DEGREES: 130
OS_K2POWER_DIOPTERS: 45.25
OS_AXISANGLE2_DEGREES: 40
OD_K2POWER_DIOPTERS: 45.25
OD_K1POWER_DIOPTERS: 44.25
OD_AXISANGLE2_DEGREES: 124
OD_AXISANGLE_DEGREES: 34
OS_K1POWER_DIOPTERS: 44.5

## 2024-08-20 ASSESSMENT — VISUAL ACUITY
OD_CC: 20/40
OS_CC: 20/25

## 2024-08-20 ASSESSMENT — TONOMETRY
OS_IOP_MMHG: 18
OD_IOP_MMHG: 20

## (undated) RX ORDER — SODIUM CHLORIDE 50 MG/G: OINTMENT OPHTHALMIC EVERY EVENING

## (undated) RX ORDER — CYCLOSPORINE 0.5 MG/ML: 1 EMULSION OPHTHALMIC TWICE A DAY